# Patient Record
Sex: FEMALE | Race: WHITE | Employment: OTHER | URBAN - METROPOLITAN AREA
[De-identification: names, ages, dates, MRNs, and addresses within clinical notes are randomized per-mention and may not be internally consistent; named-entity substitution may affect disease eponyms.]

---

## 2024-10-29 ENCOUNTER — ANESTHESIA EVENT (OUTPATIENT)
Dept: ENDOSCOPY | Age: 79
End: 2024-10-29
Payer: MEDICARE

## 2024-10-29 ENCOUNTER — APPOINTMENT (OUTPATIENT)
Dept: ENDOSCOPY | Age: 79
End: 2024-10-29
Attending: INTERNAL MEDICINE
Payer: MEDICARE

## 2024-10-29 ENCOUNTER — ANESTHESIA (OUTPATIENT)
Dept: ENDOSCOPY | Age: 79
End: 2024-10-29
Payer: MEDICARE

## 2024-10-29 ENCOUNTER — HOSPITAL ENCOUNTER (OUTPATIENT)
Age: 79
Setting detail: OUTPATIENT SURGERY
Discharge: HOME | End: 2024-10-29
Attending: INTERNAL MEDICINE | Admitting: INTERNAL MEDICINE
Payer: MEDICARE

## 2024-10-29 VITALS
HEIGHT: 63 IN | HEART RATE: 98 BPM | WEIGHT: 172.6 LBS | DIASTOLIC BLOOD PRESSURE: 75 MMHG | OXYGEN SATURATION: 96 % | RESPIRATION RATE: 16 BRPM | BODY MASS INDEX: 30.58 KG/M2 | SYSTOLIC BLOOD PRESSURE: 135 MMHG | TEMPERATURE: 97 F

## 2024-10-29 DIAGNOSIS — D50.0 IRON DEFICIENCY ANEMIA DUE TO CHRONIC BLOOD LOSS: ICD-10-CM

## 2024-10-29 PROCEDURE — 2709999900 HC NON-CHARGEABLE SUPPLY: Performed by: INTERNAL MEDICINE

## 2024-10-29 PROCEDURE — 7100000011 HC PHASE II RECOVERY - ADDTL 15 MIN: Performed by: INTERNAL MEDICINE

## 2024-10-29 PROCEDURE — 3609010600 HC COLONOSCOPY POLYPECTOMY SNARE/COLD BIOPSY: Performed by: INTERNAL MEDICINE

## 2024-10-29 PROCEDURE — 7100000010 HC PHASE II RECOVERY - FIRST 15 MIN: Performed by: INTERNAL MEDICINE

## 2024-10-29 PROCEDURE — 2580000003 HC RX 258

## 2024-10-29 PROCEDURE — 6360000002 HC RX W HCPCS

## 2024-10-29 PROCEDURE — 3609012400 HC EGD TRANSORAL BIOPSY SINGLE/MULTIPLE: Performed by: INTERNAL MEDICINE

## 2024-10-29 PROCEDURE — 3700000001 HC ADD 15 MINUTES (ANESTHESIA): Performed by: INTERNAL MEDICINE

## 2024-10-29 PROCEDURE — 3700000000 HC ANESTHESIA ATTENDED CARE: Performed by: INTERNAL MEDICINE

## 2024-10-29 PROCEDURE — 88305 TISSUE EXAM BY PATHOLOGIST: CPT

## 2024-10-29 RX ORDER — ASPIRIN 81 MG/1
81 TABLET, CHEWABLE ORAL DAILY
Status: ON HOLD | COMMUNITY
End: 2024-10-29

## 2024-10-29 RX ORDER — FERROUS SULFATE 325(65) MG
325 TABLET ORAL
COMMUNITY

## 2024-10-29 RX ORDER — LIDOCAINE HYDROCHLORIDE 20 MG/ML
INJECTION, SOLUTION INTRAVENOUS
Status: DISCONTINUED | OUTPATIENT
Start: 2024-10-29 | End: 2024-10-29 | Stop reason: SDUPTHER

## 2024-10-29 RX ORDER — EZETIMIBE 10 MG/1
10 TABLET ORAL DAILY
COMMUNITY
Start: 2024-06-17

## 2024-10-29 RX ORDER — PROPOFOL 10 MG/ML
INJECTION, EMULSION INTRAVENOUS
Status: DISCONTINUED | OUTPATIENT
Start: 2024-10-29 | End: 2024-10-29 | Stop reason: SDUPTHER

## 2024-10-29 RX ORDER — SODIUM CHLORIDE 9 MG/ML
INJECTION, SOLUTION INTRAMUSCULAR; INTRAVENOUS; SUBCUTANEOUS
Status: DISCONTINUED | OUTPATIENT
Start: 2024-10-29 | End: 2024-10-29 | Stop reason: SDUPTHER

## 2024-10-29 RX ORDER — ESCITALOPRAM OXALATE 10 MG/1
1 TABLET ORAL
COMMUNITY

## 2024-10-29 RX ORDER — ESMOLOL HYDROCHLORIDE 10 MG/ML
INJECTION INTRAVENOUS
Status: DISCONTINUED | OUTPATIENT
Start: 2024-10-29 | End: 2024-10-29 | Stop reason: SDUPTHER

## 2024-10-29 RX ORDER — LEVOTHYROXINE SODIUM 25 UG/1
25 TABLET ORAL DAILY
COMMUNITY
Start: 2024-09-09

## 2024-10-29 RX ORDER — PRAVASTATIN SODIUM 80 MG/1
1 TABLET ORAL
COMMUNITY

## 2024-10-29 RX ORDER — GLYCOPYRROLATE 0.2 MG/ML
INJECTION INTRAMUSCULAR; INTRAVENOUS
Status: DISCONTINUED | OUTPATIENT
Start: 2024-10-29 | End: 2024-10-29 | Stop reason: SDUPTHER

## 2024-10-29 RX ADMIN — SODIUM CHLORIDE 20 ML: 9 INJECTION, SOLUTION INTRAMUSCULAR; INTRAVENOUS; SUBCUTANEOUS at 11:59

## 2024-10-29 RX ADMIN — SODIUM CHLORIDE 10 ML: 9 INJECTION, SOLUTION INTRAMUSCULAR; INTRAVENOUS; SUBCUTANEOUS at 11:25

## 2024-10-29 RX ADMIN — SODIUM CHLORIDE 10 ML: 9 INJECTION, SOLUTION INTRAMUSCULAR; INTRAVENOUS; SUBCUTANEOUS at 11:38

## 2024-10-29 RX ADMIN — PROPOFOL 30 MG: 10 INJECTION, EMULSION INTRAVENOUS at 11:31

## 2024-10-29 RX ADMIN — ESMOLOL HYDROCHLORIDE 10 MG: 10 INJECTION, SOLUTION INTRAVENOUS at 11:42

## 2024-10-29 RX ADMIN — PROPOFOL 30 MG: 10 INJECTION, EMULSION INTRAVENOUS at 12:00

## 2024-10-29 RX ADMIN — PROPOFOL 20 MG: 10 INJECTION, EMULSION INTRAVENOUS at 11:53

## 2024-10-29 RX ADMIN — SODIUM CHLORIDE 10 ML: 9 INJECTION, SOLUTION INTRAMUSCULAR; INTRAVENOUS; SUBCUTANEOUS at 11:49

## 2024-10-29 RX ADMIN — PROPOFOL 30 MG: 10 INJECTION, EMULSION INTRAVENOUS at 11:38

## 2024-10-29 RX ADMIN — ESMOLOL HYDROCHLORIDE 10 MG: 10 INJECTION, SOLUTION INTRAVENOUS at 11:34

## 2024-10-29 RX ADMIN — SODIUM CHLORIDE 10 ML: 9 INJECTION, SOLUTION INTRAMUSCULAR; INTRAVENOUS; SUBCUTANEOUS at 11:34

## 2024-10-29 RX ADMIN — PROPOFOL 40 MG: 10 INJECTION, EMULSION INTRAVENOUS at 11:57

## 2024-10-29 RX ADMIN — PROPOFOL 30 MG: 10 INJECTION, EMULSION INTRAVENOUS at 11:28

## 2024-10-29 RX ADMIN — PROPOFOL 30 MG: 10 INJECTION, EMULSION INTRAVENOUS at 11:35

## 2024-10-29 RX ADMIN — SODIUM CHLORIDE 30 ML: 9 INJECTION, SOLUTION INTRAMUSCULAR; INTRAVENOUS; SUBCUTANEOUS at 11:22

## 2024-10-29 RX ADMIN — PROPOFOL 20 MG: 10 INJECTION, EMULSION INTRAVENOUS at 11:23

## 2024-10-29 RX ADMIN — SODIUM CHLORIDE 10 ML: 9 INJECTION, SOLUTION INTRAMUSCULAR; INTRAVENOUS; SUBCUTANEOUS at 11:54

## 2024-10-29 RX ADMIN — PROPOFOL 50 MG: 10 INJECTION, EMULSION INTRAVENOUS at 11:22

## 2024-10-29 RX ADMIN — ESMOLOL HYDROCHLORIDE 10 MG: 10 INJECTION, SOLUTION INTRAVENOUS at 11:52

## 2024-10-29 RX ADMIN — PROPOFOL 30 MG: 10 INJECTION, EMULSION INTRAVENOUS at 11:49

## 2024-10-29 RX ADMIN — SODIUM CHLORIDE 10 ML: 9 INJECTION, SOLUTION INTRAMUSCULAR; INTRAVENOUS; SUBCUTANEOUS at 11:30

## 2024-10-29 RX ADMIN — PROPOFOL 50 MG: 10 INJECTION, EMULSION INTRAVENOUS at 11:25

## 2024-10-29 RX ADMIN — PROPOFOL 30 MG: 10 INJECTION, EMULSION INTRAVENOUS at 11:42

## 2024-10-29 RX ADMIN — SODIUM CHLORIDE 10 ML: 9 INJECTION, SOLUTION INTRAMUSCULAR; INTRAVENOUS; SUBCUTANEOUS at 11:28

## 2024-10-29 RX ADMIN — PROPOFOL 30 MG: 10 INJECTION, EMULSION INTRAVENOUS at 11:46

## 2024-10-29 RX ADMIN — Medication 100 MG: at 11:22

## 2024-10-29 RX ADMIN — SODIUM CHLORIDE 20 ML: 9 INJECTION, SOLUTION INTRAMUSCULAR; INTRAVENOUS; SUBCUTANEOUS at 12:03

## 2024-10-29 RX ADMIN — SODIUM CHLORIDE 10 ML: 9 INJECTION, SOLUTION INTRAMUSCULAR; INTRAVENOUS; SUBCUTANEOUS at 11:46

## 2024-10-29 RX ADMIN — SODIUM CHLORIDE 10 ML: 9 INJECTION, SOLUTION INTRAMUSCULAR; INTRAVENOUS; SUBCUTANEOUS at 11:42

## 2024-10-29 RX ADMIN — GLYCOPYRROLATE 0.2 MG: 0.2 INJECTION INTRAMUSCULAR; INTRAVENOUS at 11:22

## 2024-10-29 ASSESSMENT — ENCOUNTER SYMPTOMS: SHORTNESS OF BREATH: 0

## 2024-10-29 ASSESSMENT — PAIN SCALES - GENERAL
PAINLEVEL_OUTOF10: 0

## 2024-10-29 NOTE — DISCHARGE INSTRUCTIONS
PLAN:   -We will follow-up with biopsy results and update the patient via the [employer] patient portal within 1-2 weeks.  Anticipate no recall colonoscopy for screening purposes  -With large hiatal hernia, I would recommend continuing prevacid in the morning and pepcid at night  -High-fiber diet  -continue iron supplement.       ENDOSCOPY DISCHARGE INSTRUCTIONS:    Call the physician that did your procedure for any questions or concern:    [facility]: [phone]  DR. BLAYNE SERRANO      ACTIVITY:    There are potential side effects to the medications used for sedation and anesthesia during your procedure.  These include:  Dizziness or light-headedness, confusion or memory loss, delayed reaction times, loss of coordination, nausea and vomiting.  Because of your increased risk for injury, we ask that you observe the following precautions:  For the next 24 hours,  DO NOT operate an automobile, bicycle, motorcycle, , power tools or large equipment of any kind.  Do not drink alcohol, sign any legal documents or make any legal decisions for 24 hours.  Do not bend your head over lower than your heart.  DO sit on the side of bed/couch awhile before getting up.  Plan on bedrest or quiet relaxation today.  You may resume normal activities in 24 hours.    DIET:    Your first meal today should be light, avoiding spicy and fatty foods.  If you tolerate this first meal, then you may advance to your regular diet unless otherwise advised by your physician.    NORMAL SYMPTOMS:  -Mild sore throat if you’ve had an EGD   -Gaseous discomfort    NOTIFY YOUR PHYSICIAN IF THESE SYMPTOMS OCCUR:  1. Fever (greater than 100)  5. Increased abdominal bloating  2. Severe pain    6. Excessive bleeding  3. Nausea and vomiting  7. Chest pain                                                                    4. Chills    8. Shortness of breath    ADDITIONAL INSTRUCTIONS:    Biopsy results: Call [email or web address] for biopsy results in 1  closely for changes in your health, and be sure to contact your doctor if:    Your throat still hurts after a day or two.     You do not get better as expected.   Where can you learn more?  Go to https://www.[employer].net/patientEd and enter J454 to learn more about \"Upper GI Endoscopy: What to Expect at Home.\"  Current as of: October 19, 2023  Content Version: 14.2  © 2024 [employer].   Care instructions adapted under license by [employer]. If you have questions about a medical condition or this instruction, always ask your healthcare professional. [employer] disclaims any warranty or liability for your use of this information.         Colon Polyps: Care Instructions  Your Care Instructions     Colon polyps are growths in the colon or the rectum. The cause of most colon polyps is not known, and most people who get them do not have any problems. But a certain kind can turn into cancer. For this reason, regular testing for colon polyps is important for people as they get older. It is also important for anyone who has an increased risk for colon cancer.  Polyps are usually found through routine colon cancer screening tests. Although most colon polyps are not cancerous, they are usually removed and then tested for cancer. Screening for colon cancer saves lives because the cancer can usually be cured if it is caught early.  If you have a polyp that is the type that can turn into cancer, you may need more tests to examine your entire colon. The doctor will remove any other polyps that are found, and you will be tested more often.  Follow-up care is a key part of your treatment and safety. Be sure to make and go to all appointments, and call your doctor if you are having problems. It's also a good idea to know your test results and keep a list of the medicines you take.  How can you care for yourself at home?  Regular exams to look for colon polyps are the best way to prevent polyps from

## 2024-10-29 NOTE — ANESTHESIA PRE PROCEDURE
Department of Anesthesiology  Preprocedure Note       Name:  Tracie Law   Age:  79 y.o.  :  1945                                          MRN:  [id number]         Date:  10/29/2024      Surgeon: Surgeon(s):  Ever Kyle MD    Procedure: Procedure(s):  COLONOSCOPY  ESOPHAGOGASTRODUODENOSCOPY    Medications prior to admission:   Prior to Admission medications    Medication Sig Start Date End Date Taking? Authorizing Provider   Cholecalciferol 50 MCG ( UT) TABS Take by mouth   Yes Tori Downey MD   cyanocobalamin 500 MCG tablet Take 1 tablet by mouth daily   Yes Tori Downey MD   escitalopram (LEXAPRO) 10 MG tablet Take 1 tablet by mouth Every Day   Yes Tori Downey MD   ezetimibe (ZETIA) 10 MG tablet Take 1 tablet by mouth daily 24  Yes Tori Downey MD   ferrous sulfate (IRON 325) 325 (65 Fe) MG tablet Take 1 tablet by mouth daily (with breakfast)   Yes Tori Downey MD   levothyroxine (SYNTHROID) 25 MCG tablet Take 1 tablet by mouth daily 24  Yes Tori Downey MD   pravastatin (PRAVACHOL) 80 MG tablet Take 1 tablet by mouth Every Day   Yes Tori Downey MD   LANSOPRAZOLE PO Take by mouth   Yes Tori Downey MD       Current medications:    No current facility-administered medications for this encounter.       Allergies:    Allergies   Allergen Reactions   • Clindamycin Nausea Only   • Omeprazole Hives and Other (See Comments)   • Pantoprazole Hives   • Esomeprazole Magnesium Hives and Rash   • Penicillins Hives, Rash and Other (See Comments)   • Statins Myalgia and Other (See Comments)     Muscle aches       Problem List:  There is no problem list on file for this patient.      Past Medical History:        Diagnosis Date   • Hyperlipidemia    • Hypothyroidism    • ANA (obstructive sleep apnea)    • Osteoarthritis        Past Surgical History:  No past surgical history on file.    Social History:    Social History     Tobacco

## 2024-10-29 NOTE — PROCEDURES
EGD/COLONOSCOPY     Patient: Tracie Law MRN: [id number]   YOB: 1945 Age: 79 y.o. Sex: female   Unit: [facility] ENDOSCOPY Room/Bed: Endo Pool/NONE Location: [facility]    Admitting Physician: BLAYNE SERRANO     Primary Care Physician: Cora Ramon MD      DATE OF PROCEDURE: 10/29/2024  PROCEDURE: EGD/Colonoscopy    PREOPERATIVE DIAGNOSIS: Iron deficiency anemia due to chronic blood loss [D50.0]  HPI: Diagnostic EGD and colonoscopy for recurrent iron deficiency anemia previously attributed to known large hiatal hernia with Fausto erosions.  She previously did well on twice daily PPI but understandably reduced to once a day out of concerns for chronic PPI use by other physicians.  In the context of reducing PPI, she has noted worse burping, fatigue.  She has had some drop in her hemoglobin to 11.9, with iron deficiency.  Ferritin was 46.  5/19 EGD/colonoscopy with 5 cm sliding-type hiatal hernia without Fausto erosions, normal duodenum.  Colon with a single polyp, diverticulosis and a 5-year recall.  At that time she was on Prevacid twice daily.  When we saw her in the office a month ago, we continued Prevacid daily but added Pepcid before bed, which resolved the burping and upper gi symptoms.      ENDOSCOPIST: BLAYNE SERRANO MD    POSTOPERATIVE DIAGNOSIS:    -Esophagus was mildly tortuous, otherwise unremarkable.  -Stomach contained large 7 cm hiatal hernia, 4 mm polyp along the greater curve at the diaphragmatic hiatus, biopsied, linear gastritis in the antrum biopsied to rule out GAVE, and separately a small erosion in the antrum with biopsies obtained to rule out H. pylori.  No Fausto ulcers seen  -Duodenum unremarkable to the second portion.  -Terminal ileum is normal.  -1 small descending colon polyp removed and retrieved with a cold snare.  -small internal hemorrhoids.    -Fair prep because of residual fecaliths    PLAN:   -We will follow-up with biopsy results and update the  patient via the [facility] patient portal within 1-2 weeks.  Anticipate no recall colonoscopy for screening purposes  -With large hiatal hernia, I would recommend continuing prevacid in the morning and pepcid at night  -High-fiber diet  -continue iron supplement.     INFORMED CONSENT:  Informed consent for colonoscopy was obtained.  The benefits and risks including adverse medicine reaction and perforation have been explained.  The patient's questions were answered and the patient agreed to proceed.    ASA: ASA 3 - Patient with moderate systemic disease with functional limitations     SEDATION: MAC    The patient's vital signs, cardiac status, pulmonary status, abdominal status and mental status were stable for the procedure. The patient's vital signs and respiratory function as monitored by oxygen saturation remained stable.    COLON PREPARATION:  The patient was given a split colon preparation and the preparation was adequate.    EGD/Colonoscopy Procedure Details:      Procedure Details:    The Olympus videoendoscope was inserted into the mouth and carefully passed into the esophagus, through the stomach and to the distal duodenum.  Retroflexion in the cardia and fundus was performed.  Next, a digital rectal exam was performed.  The Olympus videocolonoscope  was inserted in the rectum and carefully advanced to the cecum as identified by IC valve, crow's foot appearance and appendix.  Retroflexion in the right colon was performed more than the endoscope was straightened and advanced back to the cecum.  The colonoscope was slowly withdrawn with careful inspection around and between folds.  Retroflexion in the rectum was performed.   Cecum Intubated: Yes    Findings:   The tubular esophagus was somewhat tortuous but otherwise was unremarkable.  The GE junction was the same level as the Z-line, which was at about 33 cm from the incisors.  The diaphragm was at about 40 cm.  Therefore the stomach contained a 7 cm

## 2024-10-29 NOTE — PROGRESS NOTES
Ambulatory Surgery/Procedure Discharge Note    Vitals:    10/29/24 1220   BP: (!) 148/74   Pulse: (!) 102   Resp: 16   Temp:    SpO2: 95%       Pt ready for discharge per Sinai score    No intake/output data recorded.    Restroom use offered before discharge.  Yes    Pain assessment:  level of pain (1-10, 10 severe),   Pain Level: 0    Pt and S.O./family states \"ready to go home\". Pt alert and oriented x4. IV removed. Denies N/V or pain. Voided prior to discharge. Pt tolerating po intake. Discharge instructions given to pt and spouse with pt permission. Pt and spouse verbalized understanding of all instructions. Left with all belongings,  and discharge instructions. Patient discharged to home/self care. Patient discharged via wheel chair by transporter to waiting family/S.O.       10/29/2024 12:22 PM

## 2024-10-29 NOTE — ANESTHESIA POSTPROCEDURE EVALUATION
Department of Anesthesiology  Postprocedure Note    Patient: Tracie Law  MRN: [id number]  YOB: 1945  Date of evaluation: 10/29/2024    Procedure Summary       Date: 10/29/24 Room / Location: [place] [address] / [facility]    Anesthesia Start: 1116 Anesthesia Stop: 1207    Procedures:       COLONOSCOPY POLYPECTOMY SNARE/BIOPSY      ESOPHAGOGASTRODUODENOSCOPY BIOPSY Diagnosis:       Iron deficiency anemia due to chronic blood loss      (Iron deficiency anemia due to chronic blood loss [D50.0])    Surgeons: Ever Kyle MD Responsible Provider: Kezia Hanson MD    Anesthesia Type: MAC ASA Status: 3            Anesthesia Type: MAC    Sinai Phase I: Sinai Score: 10    Sinai Phase II: Sinai Score: 10    Anesthesia Post Evaluation    Patient location during evaluation: PACU  Patient participation: complete - patient participated  Level of consciousness: awake  Pain score: 2  Airway patency: patent  Nausea & Vomiting: no nausea and no vomiting  Cardiovascular status: hemodynamically stable  Respiratory status: acceptable  Hydration status: euvolemic  Pain management: adequate    No notable events documented.

## 2024-10-29 NOTE — H&P
Gastroenterology Outpatient History and Physical     Patient: Tracie Law MRN: [id number] Sex: female   YOB: 1945 Age: 79 y.o. Location: [facility]    Date:10/29/2024  Primary Care Physician: Cora Ramon MD         Patient: Tracie Law    Physician: BLAYNE SERRANO MD    History of Present Illness:   Diagnostic EGD and colonoscopy for recurrent iron deficiency anemia previously attributed to known large hiatal hernia with Fausto erosions.  She previously did well on twice daily PPI but understandably reduced to once a day out of concerns for chronic PPI use by other physicians.  In the context of reducing PPI, she has noted worse burping, fatigue.  She has had some drop in her hemoglobin to 11.9, with iron deficiency.  Ferritin was 46.  5/19 EGD/colonoscopy with 5 cm sliding-type hiatal hernia without Fausto erosions, normal duodenum.  Colon with a single polyp, diverticulosis and a 5-year recall.  At that time she was on Prevacid twice daily.  When we saw her in the office a month ago, we continued Prevacid daily but added Pepcid before bed, which resolved the burping and upper gi symptoms.      Review of Systems:  Weight Loss: No  Dysphagia: No  Dyspepsia: No  History:  Past Medical History:   Diagnosis Date    GERD (gastroesophageal reflux disease)     Hyperlipidemia     Hypothyroidism     ANA (obstructive sleep apnea)     wears CPAP    Osteoarthritis     Prediabetes       Past Surgical History:   Procedure Laterality Date    BREAST BIOPSY Right     X 2    MENISCECTOMY Right     NEUROMA SURGERY        Social History     Socioeconomic History    Marital status:      Spouse name: None    Number of children: None    Years of education: None    Highest education level: None   Tobacco Use    Smoking status: Former     Types: Cigarettes    Smokeless tobacco: Never   Vaping Use    Vaping status: Never Used   Substance and Sexual Activity    Alcohol use: Never    Drug

## (undated) DEVICE — TRAP SPEC RETRV CLR PLAS POLYP IN LN SUCT QUIK CTCH

## (undated) DEVICE — FORCEPS BX L240CM JAW DIA2.4MM ORNG L CAP W/ NDL DISP RAD

## (undated) DEVICE — SNARES COLD OVAL 10MM THIN